# Patient Record
Sex: FEMALE | Race: WHITE | NOT HISPANIC OR LATINO | URBAN - METROPOLITAN AREA
[De-identification: names, ages, dates, MRNs, and addresses within clinical notes are randomized per-mention and may not be internally consistent; named-entity substitution may affect disease eponyms.]

---

## 2017-07-28 ENCOUNTER — OUTPATIENT (OUTPATIENT)
Dept: OUTPATIENT SERVICES | Facility: HOSPITAL | Age: 57
LOS: 1 days | End: 2017-07-28
Payer: COMMERCIAL

## 2017-07-28 ENCOUNTER — APPOINTMENT (OUTPATIENT)
Dept: NUCLEAR MEDICINE | Facility: IMAGING CENTER | Age: 57
End: 2017-07-28

## 2017-07-28 DIAGNOSIS — G20 PARKINSON'S DISEASE: ICD-10-CM

## 2017-07-28 PROCEDURE — A9552: CPT

## 2017-07-28 PROCEDURE — 78608 BRAIN IMAGING (PET): CPT

## 2019-01-30 ENCOUNTER — APPOINTMENT (OUTPATIENT)
Dept: NEUROLOGY | Facility: CLINIC | Age: 59
End: 2019-01-30

## 2019-11-20 ENCOUNTER — EMERGENCY (EMERGENCY)
Facility: HOSPITAL | Age: 59
LOS: 1 days | Discharge: HOME | End: 2019-11-20
Attending: EMERGENCY MEDICINE
Payer: MEDICARE

## 2019-11-20 VITALS
OXYGEN SATURATION: 97 % | TEMPERATURE: 98 F | RESPIRATION RATE: 16 BRPM | SYSTOLIC BLOOD PRESSURE: 111 MMHG | DIASTOLIC BLOOD PRESSURE: 78 MMHG | HEART RATE: 86 BPM | WEIGHT: 169.98 LBS | HEIGHT: 67 IN

## 2019-11-20 VITALS
TEMPERATURE: 98 F | OXYGEN SATURATION: 96 % | DIASTOLIC BLOOD PRESSURE: 74 MMHG | RESPIRATION RATE: 18 BRPM | HEART RATE: 85 BPM | SYSTOLIC BLOOD PRESSURE: 112 MMHG

## 2019-11-20 LAB
ANION GAP SERPL CALC-SCNC: 13 MMOL/L — SIGNIFICANT CHANGE UP (ref 5–17)
BASOPHILS # BLD AUTO: 0.02 K/UL — SIGNIFICANT CHANGE UP (ref 0–0.2)
BASOPHILS NFR BLD AUTO: 0.3 % — SIGNIFICANT CHANGE UP (ref 0–2)
BUN SERPL-MCNC: 18 MG/DL — SIGNIFICANT CHANGE UP (ref 7–23)
CALCIUM SERPL-MCNC: 9.9 MG/DL — SIGNIFICANT CHANGE UP (ref 8.4–10.5)
CHLORIDE SERPL-SCNC: 102 MMOL/L — SIGNIFICANT CHANGE UP (ref 96–108)
CK SERPL-CCNC: 53 U/L — SIGNIFICANT CHANGE UP (ref 25–170)
CO2 SERPL-SCNC: 25 MMOL/L — SIGNIFICANT CHANGE UP (ref 22–31)
CREAT SERPL-MCNC: 0.62 MG/DL — SIGNIFICANT CHANGE UP (ref 0.5–1.3)
EOSINOPHIL # BLD AUTO: 0.06 K/UL — SIGNIFICANT CHANGE UP (ref 0–0.5)
EOSINOPHIL NFR BLD AUTO: 0.9 % — SIGNIFICANT CHANGE UP (ref 0–6)
GLUCOSE SERPL-MCNC: 96 MG/DL — SIGNIFICANT CHANGE UP (ref 70–99)
HCT VFR BLD CALC: 39.3 % — SIGNIFICANT CHANGE UP (ref 34.5–45)
HGB BLD-MCNC: 12.3 G/DL — SIGNIFICANT CHANGE UP (ref 11.5–15.5)
IMM GRANULOCYTES NFR BLD AUTO: 0.3 % — SIGNIFICANT CHANGE UP (ref 0–1.5)
LYMPHOCYTES # BLD AUTO: 1.09 K/UL — SIGNIFICANT CHANGE UP (ref 1–3.3)
LYMPHOCYTES # BLD AUTO: 15.6 % — SIGNIFICANT CHANGE UP (ref 13–44)
MCHC RBC-ENTMCNC: 30.3 PG — SIGNIFICANT CHANGE UP (ref 27–34)
MCHC RBC-ENTMCNC: 31.3 GM/DL — LOW (ref 32–36)
MCV RBC AUTO: 96.8 FL — SIGNIFICANT CHANGE UP (ref 80–100)
MONOCYTES # BLD AUTO: 0.36 K/UL — SIGNIFICANT CHANGE UP (ref 0–0.9)
MONOCYTES NFR BLD AUTO: 5.2 % — SIGNIFICANT CHANGE UP (ref 2–14)
NEUTROPHILS # BLD AUTO: 5.42 K/UL — SIGNIFICANT CHANGE UP (ref 1.8–7.4)
NEUTROPHILS NFR BLD AUTO: 77.7 % — HIGH (ref 43–77)
NRBC # BLD: 0 /100 WBCS — SIGNIFICANT CHANGE UP (ref 0–0)
PLATELET # BLD AUTO: 254 K/UL — SIGNIFICANT CHANGE UP (ref 150–400)
POTASSIUM SERPL-MCNC: 4.3 MMOL/L — SIGNIFICANT CHANGE UP (ref 3.5–5.3)
POTASSIUM SERPL-SCNC: 4.3 MMOL/L — SIGNIFICANT CHANGE UP (ref 3.5–5.3)
RBC # BLD: 4.06 M/UL — SIGNIFICANT CHANGE UP (ref 3.8–5.2)
RBC # FLD: 12.1 % — SIGNIFICANT CHANGE UP (ref 10.3–14.5)
SODIUM SERPL-SCNC: 140 MMOL/L — SIGNIFICANT CHANGE UP (ref 135–145)
TROPONIN T, HIGH SENSITIVITY RESULT: <6 NG/L — SIGNIFICANT CHANGE UP (ref 0–51)
WBC # BLD: 6.97 K/UL — SIGNIFICANT CHANGE UP (ref 3.8–10.5)
WBC # FLD AUTO: 6.97 K/UL — SIGNIFICANT CHANGE UP (ref 3.8–10.5)

## 2019-11-20 PROCEDURE — 71250 CT THORAX DX C-: CPT | Mod: 26

## 2019-11-20 PROCEDURE — 85027 COMPLETE CBC AUTOMATED: CPT

## 2019-11-20 PROCEDURE — 80048 BASIC METABOLIC PNL TOTAL CA: CPT

## 2019-11-20 PROCEDURE — 71045 X-RAY EXAM CHEST 1 VIEW: CPT | Mod: 26

## 2019-11-20 PROCEDURE — 99285 EMERGENCY DEPT VISIT HI MDM: CPT

## 2019-11-20 PROCEDURE — 71045 X-RAY EXAM CHEST 1 VIEW: CPT

## 2019-11-20 PROCEDURE — 82550 ASSAY OF CK (CPK): CPT

## 2019-11-20 PROCEDURE — 71250 CT THORAX DX C-: CPT

## 2019-11-20 PROCEDURE — 99284 EMERGENCY DEPT VISIT MOD MDM: CPT | Mod: 25

## 2019-11-20 PROCEDURE — 84484 ASSAY OF TROPONIN QUANT: CPT

## 2019-11-20 PROCEDURE — 36415 COLL VENOUS BLD VENIPUNCTURE: CPT

## 2019-11-20 RX ORDER — TRAMADOL HYDROCHLORIDE 50 MG/1
50 TABLET ORAL ONCE
Refills: 0 | Status: DISCONTINUED | OUTPATIENT
Start: 2019-11-20 | End: 2019-11-20

## 2019-11-20 RX ADMIN — TRAMADOL HYDROCHLORIDE 50 MILLIGRAM(S): 50 TABLET ORAL at 20:17

## 2019-11-20 RX ADMIN — TRAMADOL HYDROCHLORIDE 50 MILLIGRAM(S): 50 TABLET ORAL at 19:40

## 2019-11-20 NOTE — ED ADULT NURSE NOTE - NSIMPLEMENTINTERV_GEN_ALL_ED
Implemented All Fall Risk Interventions:  [place] to call system. Call bell, personal items and telephone within reach. Instruct patient to call for assistance. Room bathroom lighting operational. Non-slip footwear when patient is off stretcher. Physically safe environment: no spills, clutter or unnecessary equipment. Stretcher in lowest position, wheels locked, appropriate side rails in place. Provide visual cue, wrist band, yellow gown, etc. Monitor gait and stability. Monitor for mental status changes and reorient to person, place, and time. Review medications for side effects contributing to fall risk. Reinforce activity limits and safety measures with patient and family.

## 2019-11-20 NOTE — ED ADULT NURSE NOTE - OBJECTIVE STATEMENT
58 y/o female c/o chest pain since last week. Pt states she fell from standing height because of her Parkinson's, denies head trauma or LOC, and since then had gradual onset generalized dull chest pain and upper back pain. States pain worsens upon movement, coughing, and taking deep breath. Took tylenol when pain began last week with little relief, no other relieving factors. PMH of parkinson's and frequent falls and loss of balance d/t unsteady gait. Denies N/V/D, confusion, change in mental status, heart palpitations, SOB, dizziness, numbness or tingling, headache, neck pain, urinary or bowel symptoms, change in appetite, fever, chills, cough, dyspnea. A&Ox4, breath sounds clear, no abd distention or tenderness, no edema noted, green/black/blue bruise noted over L pectoral region that pt states appeared after fall last week, otherwise skin warm dry and intact. IV placed and labs drawn, will continue to reassess. 60 y/o female c/o chest pain since last week. Pt states she fell from standing height because of her Parkinson's, denies head trauma or LOC, and since then had gradual onset generalized dull chest pain and upper back pain. States pain worsens upon movement, coughing, and taking deep breath. Took tylenol when pain began last week with little relief, no other relieving factors. PMH of parkinson's and frequent falls and loss of balance d/t unsteady gait. Denies N/V/D, confusion, change in mental status, heart palpitations, SOB, dizziness, numbness or tingling, headache, neck pain, urinary or bowel symptoms, change in appetite, fever, chills, cough, dyspnea. A&Ox4, breath sounds clear, no abd distention or tenderness, no edema noted, green/black/blue bruise noted over L pectoral region that pt states appeared after fall last week, otherwise skin warm dry and intact. IV placed and labs drawn, EKG performed, will continue to reassess.

## 2019-11-20 NOTE — ED PROVIDER NOTE - PROGRESS NOTE DETAILS
Attending MD Bardales: Patient re-evaluated and feeling improved.  No acute issues at  this time.  Lab and radiology tests reviewed with patient and family.  Given IS and instruction given.  Patient verbalized understanding.  Findings of L lateral old rib fx discussed with patient.  REcommend OTC pain control.  Explained no Tramadol for home as patient reported minimal improvement and concern for side effects given baseline unsteadiness with ambulation.  Patient agrees.  Patient stable for discharge. Follow up instructions given, importance of follow up emphasized, return to ED parameters reviewed and patient verbalized understanding.  All questions answered, all concerns addressed.

## 2019-11-20 NOTE — ED ADULT NURSE REASSESSMENT NOTE - NS ED NURSE REASSESS COMMENT FT1
Pt resting in bed, states she has some relief from her pain after pain medication. Requested turkey sandwich, given turkey sandwich with water. Family at bedside. Will continue to reassess. Awaiting CT scan.

## 2019-11-20 NOTE — ED PROVIDER NOTE - OBJECTIVE STATEMENT
59yof pmhx of Parkinsons disease, uses a walker, here with chest wall pain. pt reports mechanical fall last tuesday, cannot

## 2019-11-20 NOTE — ED PROVIDER NOTE - PATIENT PORTAL LINK FT
You can access the FollowMyHealth Patient Portal offered by [facility] by registering at the following website: [email or web address]. By joining [employer]’s FollowMyHealth portal, you will also be able to view your health information using other applications (apps) compatible with our system.

## 2019-11-20 NOTE — ED PROVIDER NOTE - ATTENDING CONTRIBUTION TO CARE
Attending MD Bardales:   I personally have seen and examined this patient.  Physician assistant note reviewed and agree on plan of care and except where noted.  See below for details.     Seen in [facility], accompanied by mother and uncle    59F with PMH/PSH including Parkinsons disease on Carbidopa-Levodopa and Pramipexole, asthma on Symbicort PRN, presents to the ED with chest pain s/p fall.   Reports she was at home on Tuesday 11/12/19 and was using her walker (baseline) and does not remember how she fell.  Reports that she thought she had fallen backwards.  Reports did not think that she had hit anything on the way down.  Reports that since then has been having the chest pain.  Denies worse with inspiration.  When asked to indicate where the pain is worse, indicates the entire anterior chest.  Reports that the pain has been worsening.  Reports the pain is both in the front and in the back.  Reports has been taking Tylenol 500mg BID for the pain.  Denies loss of urinary or bowel continence. Denies preceding dizziness, weakness, sensory changes.  Denies LOC, hitting head.  Denies preceding chest pain, shortness of breath, palpitations.  Denies abdominal pain, nausea, vomiting, diarrhea, blood in stools. Denies dysuria, hematuria, change in urinary habits including frequency, urgency. Denies fevers, chills.  On exam, NAD, grimacing with sitting forward, head NCAT, PERRL, FROM at neck, no tenderness to midline palpation, no stepoffs along length of spine, lungs CTAB with good inspiratory effort, +tenderness to the L upper chest above the L breast with ecchymosis and violaceous changes, no crepitus, +S1S2, no m/r/g, abdomen soft with +BS, NT, ND, no CVAT, moving all extremities with 5/5 strength bilateral upper and lower extremities, good and equal  strength bilaterally, sensory grossly intact; A/P: 59F with chest pain s/p fall, will obtain labs, CT chest, CXR, and pain control, EKG completed Attending MD Bardales:   I personally have seen and examined this patient.  Physician assistant note reviewed and agree on plan of care and except where noted.  See below for details.     Seen in [facility], accompanied by mother and uncle    59F with PMH/PSH including Parkinsons disease on Carbidopa-Levodopa and Pramipexole, asthma on Symbicort PRN, presents to the ED with chest pain s/p fall.   Reports she was at home on Tuesday 11/12/19 and was using her walker (baseline) and does not remember how she fell.  Reports that she thought she had fallen backwards.  Reports did not think that she had hit anything on the way down.  Reports that since then has been having the chest pain.  Denies worse with inspiration.  When asked to indicate where the pain is worse, indicates the entire anterior chest.  Reports that the pain has been worsening.  Reports the pain is both in the front and in the back.  Reports has been taking Tylenol 500mg BID for the pain.  Denies loss of urinary or bowel continence. Denies preceding dizziness, weakness, sensory changes.  Denies LOC, hitting head.  Denies preceding chest pain, shortness of breath, palpitations.  Denies abdominal pain, nausea, vomiting, diarrhea, blood in stools. Denies dysuria, hematuria, change in urinary habits including frequency, urgency. Denies fevers, chills.  On exam, NAD, grimacing with sitting forward, head NCAT, PERRL, FROM at neck, no tenderness to midline palpation, no stepoffs along length of spine, lungs CTAB with good inspiratory effort, +tenderness to the L upper chest above the L breast with ecchymosis and violaceous changes, no crepitus, +S1S2, no m/r/g, abdomen soft with +BS, NT, ND, no CVAT, moving all extremities with 5/5 strength bilateral upper and lower extremities, good and equal  strength bilaterally, sensory grossly intact; A/P: 59F with chest pain s/p fall, will rule out bony injury, will obtain labs, CT chest, CXR, and pain control, EKG completed

## 2019-11-20 NOTE — ED ADULT NURSE REASSESSMENT NOTE - NS ED NURSE REASSESS COMMENT FT1
Pt returned from CT, VSS, but states pain has increased again and is requesting pain medication. RN to speak with PA about ordering pain medication.

## 2019-11-20 NOTE — ED PROVIDER NOTE - NSFOLLOWUPINSTRUCTIONS_ED_ALL_ED_FT
1. Take all of your home medications as directed   2. Return to the ER for any new or worsening symptoms

## 2021-04-22 ENCOUNTER — APPOINTMENT (OUTPATIENT)
Dept: NEUROLOGY | Facility: CLINIC | Age: 61
End: 2021-04-22

## 2021-05-25 ENCOUNTER — APPOINTMENT (OUTPATIENT)
Dept: NEUROLOGY | Facility: CLINIC | Age: 61
End: 2021-05-25
Payer: MEDICARE

## 2021-05-25 VITALS
SYSTOLIC BLOOD PRESSURE: 98 MMHG | BODY MASS INDEX: 27 KG/M2 | HEART RATE: 77 BPM | WEIGHT: 168 LBS | DIASTOLIC BLOOD PRESSURE: 68 MMHG | HEIGHT: 66 IN

## 2021-05-25 DIAGNOSIS — Z82.49 FAMILY HISTORY OF ISCHEMIC HEART DISEASE AND OTHER DISEASES OF THE CIRCULATORY SYSTEM: ICD-10-CM

## 2021-05-25 DIAGNOSIS — R13.10 DYSPHAGIA, UNSPECIFIED: ICD-10-CM

## 2021-05-25 DIAGNOSIS — Z82.3 FAMILY HISTORY OF STROKE: ICD-10-CM

## 2021-05-25 DIAGNOSIS — K57.90 DIVERTICULOSIS OF INTESTINE, PART UNSPECIFIED, W/OUT PERFORATION OR ABSCESS W/OUT BLEEDING: ICD-10-CM

## 2021-05-25 DIAGNOSIS — G20 PARKINSON'S DISEASE: ICD-10-CM

## 2021-05-25 PROCEDURE — 99204 OFFICE O/P NEW MOD 45 MIN: CPT

## 2021-05-25 RX ORDER — PRAMIPEXOLE DIHYDROCHLORIDE 0.25 MG/1
0.25 TABLET ORAL
Refills: 0 | Status: ACTIVE | COMMUNITY

## 2021-05-25 NOTE — REVIEW OF SYSTEMS
[Confused or Disoriented] : confusion [Memory Lapses or Loss] : memory loss [Difficulty with Language] : ~M difficulty with language [Inability to Walk] : inability to walk [Incontinence] : incontinence [Negative] : Heme/Lymph

## 2021-05-25 NOTE — ASSESSMENT
[FreeTextEntry1] : Mrs. 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  She has mild to moderate cognitive impairment.  I explained to her daughter that neurodegenerative disorders naturally progress over time.  Her major issue appears to be dysphagia.  I suggested a swallowing assessment.  She may adjust her doses of Sinemet and pramipexole as needed.  I also encouraged physical therapy.  She has not been vaccinated against COVID-19.  I suggested that she reconsider her decision.  Further management will depend upon her clinical course.

## 2021-05-25 NOTE — HISTORY OF PRESENT ILLNESS
[FreeTextEntry1] : I had the pleasure of seeing Mrs. Priscila Rivera in the office today.  She is a 61 year right-handed patient who was referred for neurologic evaluation at your kind suggestion.  She was accompanied by her daughter Lisa who assisted with the history.\par \par Mrs. Rivera was well until approximately 7 years ago when she developed motor slowness and changes in penmanship.  This prompted her to retire from her job as a  2014.  There was a subsequent slow decline in her ability to function.  She suffered frequent falls.  Deterioration in her ability to articulate.  She developed difficulties eating choking on both liquids and solids.  She has been incontinent.  She sleeps poorly.  Lisa believes that her mother's mind is "gone."\par \par She has been evaluated by multiple neurologists in the past.  In this area she was seen by Dr. Carlos Lawler at [facility] and in 2016 by Dr. Bryant García at [facility].  She is presently under the care of Dr. Selam Mckay at [place].  \par \par She carries a diagnosis of parkinsonism.  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  She supplements this with pramipexole 0.25 mg.  She experiences significant head and dyskinesia.  She is nonambulatory.  She resides with her elderly mother in [place].\par

## 2021-05-25 NOTE — CONSULT LETTER
[Dear  ___] : Dear  [unfilled], [Consult Letter:] : I had the pleasure of evaluating your patient, [unfilled]. [Please see my note below.] : Please see my note below. [Consult Closing:] : Thank you very much for allowing me to participate in the care of this patient.  If you have any questions, please do not hesitate to contact me. [Sincerely,] : Sincerely, [FreeTextEntry3] : Viraj Ralph MD\par

## 2021-05-25 NOTE — PHYSICAL EXAM
[FreeTextEntry1] : Constitutional:  Patient was sitting in a wheelchair exhibiting  severe head and axial dyskinesias.wheelchair.. \par \par Head:  Normocephalic, atraumatic. Tympanic membranes were clear. \par \par Neck:  Supple with full range of motion. \par \par Cardiovascular:  Cardiac rhythm was regular without murmur. There were no carotid bruits. Peripheral pulses were full and symmetric.  There was bilateral pedal edema.\par \par Respiratory:  Lungs were clear. \par \par Abdomen:  Soft and nontender. \par \par Spine:  Nontender. \par \par Skin:  There were no rashes. \par \par NEUROLOGICAL EXAMINATION:\par \par Mental Status: Patient was alert and oriented. Speech was very soft and dysarthric.  He recalled none of 3 words after several minutes.\par \par Cranial Nerves: \par \par II: She could finger count bilaterally. Pupils were equal and reactive. Visual fields were full.  Fundi were not visualized.\par \par III, IV, VI:  Eye movements were full without nystagmus. \par \par V: Facial sensation was intact. \par \par VII: There was moderate facial masking.\par \par VIII: Hearing was equal. \par \par IX, X: Palatal movement was normal.  She was hypophonic.\par \par XI: Sternocleidomastoids and trapezii were normal. \par \par XII: Tongue was midline and movements slow. There was no lingual atrophy or fasciculations. \par \par Motor Examination: There was mild cogwheel rigidity at the elbows.  There were mild limb but more prominent head and neck dyskinesias.  There were no tremors.\par \par Sensory Examination: Pinprick, vibration and joint position sense were intact. \par \par Reflexes: DTRs were trace at the biceps and absent elsewhere.\par \par Plantar Responses: Plantar responses were flexor. \par \par Coordination/Cerebellar Function: There was no dysmetria on finger to nose or heel to shin testing. \par \par Gait/Stance: Not tested.\par

## 2021-09-28 ENCOUNTER — APPOINTMENT (OUTPATIENT)
Dept: NEUROLOGY | Facility: CLINIC | Age: 61
End: 2021-09-28